# Patient Record
Sex: FEMALE | Race: WHITE | NOT HISPANIC OR LATINO | Employment: UNEMPLOYED | URBAN - METROPOLITAN AREA
[De-identification: names, ages, dates, MRNs, and addresses within clinical notes are randomized per-mention and may not be internally consistent; named-entity substitution may affect disease eponyms.]

---

## 2024-07-26 ENCOUNTER — OFFICE VISIT (OUTPATIENT)
Dept: OBSTETRICS AND GYNECOLOGY | Facility: CLINIC | Age: 24
End: 2024-07-26
Payer: MEDICAID

## 2024-07-26 VITALS
WEIGHT: 121.8 LBS | SYSTOLIC BLOOD PRESSURE: 110 MMHG | DIASTOLIC BLOOD PRESSURE: 50 MMHG | BODY MASS INDEX: 20.79 KG/M2 | HEIGHT: 64 IN

## 2024-07-26 DIAGNOSIS — Z01.419 WELL WOMAN EXAM WITH ROUTINE GYNECOLOGICAL EXAM: ICD-10-CM

## 2024-07-26 DIAGNOSIS — N94.6 DYSMENORRHEA: Primary | ICD-10-CM

## 2024-07-26 RX ORDER — FLUCONAZOLE 150 MG/1
TABLET ORAL
COMMUNITY
Start: 2024-05-16 | End: 2024-07-26

## 2024-07-26 NOTE — PROGRESS NOTES
Subjective   Chief Complaint   Patient presents with    Gynecologic Exam     Well woman exam     Monet Quintana is a 23 y.o. year old  presenting to be seen for her annual exam. She lives in [place] with her . She recently finished [employer] school and is looking for employment. 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   Her previous GYN care has been at [facility] near [place].  She wanted to change providers as she had extended wait times at her previous GYN and she has concerns for her pain with her cycles which has not been adequately controlled.  She states menarche at age 14.  She has used hormonal birth control in the past for her.  Pain however had mood changes including worsening anxiety and depression while taking hormonal contraception.  She reports menstrual cramping that radiates to her lower back and thighs and abdomen.  She has increased diarrhea around her menstrual cycle.  She will occasionally have nausea and vomiting.  With her cycle last month she had such intense pain that she ended up in the ER for pain control.  She does not think she has had a transvaginal ultrasound.  She takes Midol with her cramping which helps some but does not relieve her pain entirely.  She also states that in March of this year she had 3 positive UPT's.  She took a pregnancy test every morning for 3 days due to her cycle being 5 days late.  On the fourth day she went to her GYN and had blood work which was negative.  She then had a regular cycle in April which lasted from the -.  She is currently menstruating.  She is concerned for possible endometriosis due to the pain she has with her cycles.  She occasionally has pain with intercourse.  She denies pain at times other than with her menstrual cycle.    SEXUAL Hx:  She is currently sexually active.  In the past year there there has been NO new sexual partners.    Condoms are never used.  She would not like to be screened for STD's  at today's exam.  Current birth control method: not using any form of contraception.  She is not trying to conceive but would be OK if she did get pregnant.  She is happy with her current method of contraception and does not want to discuss alternative methods of contraception.  MENSTRUAL Hx:  Patient's last menstrual period was 07/25/2024 (exact date).  In the past 6 months her cycles have been regular, predictable and occur monthly.  Her menstrual flow is typically normal.   Each month on average there are roughly 0 day(s) of very heavy flow.    Intermenstrual bleeding is absent.    Post-coital bleeding is absent.  Dysmenorrhea: severe and affecting her activities of daily living  PMS: is not affecting her activities of daily living and has a lot of irritability  Her cycles ARE a source of concern for her that she wishes to discuss today. Her bleeding is not overly bothersome but her pain is debilitating   HEALTH Hx:  She exercises regularly: no (but is planning to start exercising more).  She wears her seat belt: yes.  She has concerns about domestic violence: no.  OTHER THINGS SHE WANTS TO DISCUSS TODAY:  Nothing else    The following portions of the patient's history were reviewed and updated as appropriate:problem list, current medications, allergies, past family history, past medical history, past social history, and past surgical history.    Social History    Tobacco Use      Smoking status: Never      Smokeless tobacco: Never    Review of Systems  Constitutional POS: nothing reported    NEG: anorexia or night sweats   Genitourinary POS: nothing reported    NEG: dysuria or hematuria      Gastointestinal POS: nothing reported    NEG: bloating, change in bowel habits, melena, or reflux symptoms   Integument POS: nothing reported    NEG: moles that are changing in size, shape, color or rashes   Breast POS: nothing reported    NEG: persistent breast lump, skin dimpling, or nipple discharge        Objective   BP  "110/50 (BP Location: Left arm, Patient Position: Sitting, Cuff Size: Adult)   Ht 162.6 cm (64\")   Wt 55.2 kg (121 lb 12.8 oz)   LMP 07/25/2024 (Exact Date)   BMI 20.91 kg/m²     General:  well developed; well nourished  no acute distress   Skin:  No suspicious lesions seen   Thyroid: normal to inspection and palpation   Breasts:  Examined in supine position  Symmetric without masses or skin dimpling  Nipples normal without inversion, lesions or discharge  There are no palpable axillary nodes   Abdomen: soft, non-tender; no masses  no umbilical or inguinal hernias are present  no hepato-splenomegaly   Pelvis: Clinical staff was present for exam  :  urethral meatus normal;  Vaginal:  normal pink mucosa without prolapse or lesions.  Cervix:  normal appearance.  Menstrual bleeding noted  Uterus:  normal size, shape and consistency.  Adnexa:  normal bimanual exam of the adnexa.  Rectal:  digital rectal exam not performed; anus visually normal appearing.        Assessment   Well woman with routine gynecological exam  Dysmenorrhea     Plan     Pap was done today.  If she does not receive the results of the Pap within 2 weeks  time, she was instructed to call to find out the results.  I explained to Monet that the recommendations for Pap smear interval in a low risk patient's has lengthened to 3 years time.  I encouraged her to be seen yearly for a full physical exam including breast and pelvic exam even during the off years when PAP's will not be performed.  Discussed with patient if she has bleeding today there may be need to repeat Pap when she returns to care for her transvaginal ultrasound if they are unable to get adequate results on today's Pap.  Discussed dysmenorrhea with patient.  Will have her return to care for transvaginal ultrasound for further evaluation.  Discussed limitations of ultrasound including inability to see endometriosis on ultrasound.  Discussed endometriosis and  Discussed with patient " if she is not actively preventing pregnancy would recommend taking a prenatal vitamin containing folic acid.  The importance of keeping all planned follow-up and taking all medications as prescribed was emphasized.  Today I discussed with Monet the total recommended calcium intake for a premenopausal female is 1000 mg.  Ideally this should be from dietary sources.  I reviewed calcium content in various foods including milk, fortified orange juice and yogurt.  If she cannot get sufficient calcium through dietary means, it is recommended to supplement with either a multivitamin or calcium to reach her daily goal.  I also reviewed the difference in the bioavailability of calcium carbonate and calcium citrate containing supplements and the importance of taking calcium carbonate containing products with food.  Finally, vitamin D's role in calcium absorption was reviewed and a total daily vitamin D intake of 800 units was recommended.  Follow up for annual exam 1 year and at her convenience for transvaginal ultrasound for further follow-up of above.    No orders of the defined types were placed in this encounter.         This note was electronically signed.    Mali Abdi, MC  July 26, 2024

## 2024-08-05 PROBLEM — Z01.419 NORMAL GYNECOLOGIC EXAMINATION: Status: ACTIVE | Noted: 2024-08-05

## 2024-08-05 LAB — REF LAB TEST METHOD: NORMAL

## 2025-07-28 DIAGNOSIS — N94.6 DYSMENORRHEA: Primary | ICD-10-CM
